# Patient Record
Sex: FEMALE | Race: WHITE | NOT HISPANIC OR LATINO | Employment: FULL TIME | URBAN - METROPOLITAN AREA
[De-identification: names, ages, dates, MRNs, and addresses within clinical notes are randomized per-mention and may not be internally consistent; named-entity substitution may affect disease eponyms.]

---

## 2021-08-31 ENCOUNTER — TRANSFERRED RECORDS (OUTPATIENT)
Dept: HEALTH INFORMATION MANAGEMENT | Facility: CLINIC | Age: 26
End: 2021-08-31

## 2021-09-03 ENCOUNTER — TRANSCRIBE ORDERS (OUTPATIENT)
Dept: OTHER | Age: 26
End: 2021-09-03

## 2021-09-03 DIAGNOSIS — D64.9 ANEMIA: Primary | ICD-10-CM

## 2021-09-07 ENCOUNTER — TRANSFERRED RECORDS (OUTPATIENT)
Dept: HEALTH INFORMATION MANAGEMENT | Facility: CLINIC | Age: 26
End: 2021-09-07

## 2021-09-08 ENCOUNTER — PRE VISIT (OUTPATIENT)
Dept: OTHER | Age: 26
End: 2021-09-08

## 2021-09-08 NOTE — TELEPHONE ENCOUNTER
Action 09/08/2021   Action Taken RECORDS RECEIVED FROM WOMEN OB SENT TO [facility]  [facility]

## 2021-10-19 ENCOUNTER — TELEPHONE (OUTPATIENT)
Dept: ONCOLOGY | Facility: HOSPITAL | Age: 26
End: 2021-10-19

## 2021-10-19 DIAGNOSIS — M79.676 PAIN OF TOE, UNSPECIFIED LATERALITY: Primary | ICD-10-CM

## 2021-12-12 ENCOUNTER — HEALTH MAINTENANCE LETTER (OUTPATIENT)
Age: 26
End: 2021-12-12

## 2021-12-21 ENCOUNTER — OFFICE VISIT (OUTPATIENT)
Dept: PODIATRY | Facility: CLINIC | Age: 26
End: 2021-12-21
Payer: COMMERCIAL

## 2021-12-21 VITALS
WEIGHT: 123.8 LBS | HEIGHT: 66 IN | BODY MASS INDEX: 19.89 KG/M2 | SYSTOLIC BLOOD PRESSURE: 110 MMHG | DIASTOLIC BLOOD PRESSURE: 62 MMHG

## 2021-12-21 DIAGNOSIS — L60.1 ONYCHOLYSIS: ICD-10-CM

## 2021-12-21 DIAGNOSIS — S90.221A SUBUNGUAL HEMATOMA OF RIGHT FOOT, INITIAL ENCOUNTER: Primary | ICD-10-CM

## 2021-12-21 PROCEDURE — 99202 OFFICE O/P NEW SF 15 MIN: CPT | Performed by: PODIATRIST

## 2021-12-21 RX ORDER — DESOGESTREL AND ETHINYL ESTRADIOL 21-5 (28)
KIT ORAL SEE ADMIN INSTRUCTIONS
COMMUNITY
Start: 2020-12-03

## 2021-12-21 ASSESSMENT — MIFFLIN-ST. JEOR: SCORE: 1318.3

## 2021-12-21 NOTE — PROGRESS NOTES
"Chief Complaint:     Patient presents with:  Foot Problems: Right hallux nail         HPI:  Brinda Presley is a 26 year old year old female who presents for evaluation of a toenail.    Location- right hallux nail, similar problem starting on left hallux nail  Severity- 0/10, symptoms  Duration- 4 month(s).  Prior treatments- clean and trim nails    Runner, loses  Nails frequently & they are irregular/thick.    Past Medical & Surgical History:  No past medical history on file.   No past surgical history on file.   No family history on file.     Social History:  ?  History   Smoking Status     Never Smoker   Smokeless Tobacco     Never Used     History   Drug Use Not on file     Social History    Substance and Sexual Activity      Alcohol use: Not on file      Allergies:  ?   No Known Allergies     Medications:    Current Outpatient Medications   Medication     desogestrel-ethinyl estradiol (KARIVA) 0.15-0.02/0.01 MG (21/5) tablet     No current facility-administered medications for this visit.       Physical Exam:  ?  Vitals:  /62   Ht 1.676 m (5' 6\")   Wt 56.2 kg (123 lb 12.8 oz)   BMI 19.98 kg/m     General:  WD/WN, in NAD.  A&O x3.  Dermatologic:    Thick, discolored hallux nails.  Skin texture, turgor is normal.  Vascular:  Pulses palpable bilateral.  Digital capillary refill time <3 seconds.  Skin temperature is normal to affected digit(s).  Generalized edema- none bilateral.  Focal edema- none to affected digit(s).  Neurologic:    Gross sensation normal.  Gait and balance normal.  Musculoskeletal:  Maximal pain to palpation of affected nail border(s).  Gross deformity      Assessment:      ICD-10-CM    1. Subungual hematoma of right foot, initial encounter  S90.221A Orthopedic  Referral   2. Onycholysis  L60.1           Plan:  No orders of the defined types were placed in this encounter.      Discussed the etiology and treatment of the condition with the patient.  Discussed treatment " options.  Nothing vs temp or perm removal.  Discussed shoe gear changes to help prevent this, see AVS.      Return:  Return if symptoms worsen or fail to improve.    Tejal Dalton DPM

## 2021-12-21 NOTE — PATIENT INSTRUCTIONS
PATIENT INSTRUCTIONS    Can remove nail permanently if desired      Toe Spacers:  -Multiple toe spacers: https://www.correcttoes.com, https://naturalfootgear.com    Toe socks:   -Worn underneath spacers  -Help prevent blisters when worn in shoes with a wide toe box (with or without toe spacers)  -https://www.injinji.com  -https://www.toesox.com.  -[email or web address]    Shoes with a wide toe box:  - Foot shaped  - widest at the tips of the toes (non-tapering)  -Allow the toes to spread/splay as they naturally should  -Promote strengthening the small intrinsic muscles in the foot that balance the toes & support the arch  -Accommodate toe spacers   With arch support & cushioning:  Birkenstock, Crocs, Keen,    With cushioning:  Altra, Héctor, Lems   With minimal cushioning:  Altra, Héctor, Vivobarefcathy, Lems

## 2021-12-21 NOTE — PROGRESS NOTES
"Chief Complaint:     Patient presents with:  Foot Problems: Right hallux nail     bilateral foot pain    HPI:  Brinda Presley is a 26 year old year old female who presents for evaluation of foot pain.    ***    Past Medical & Surgical History:  No past medical history on file.   No past surgical history on file.   No family history on file.     Social History:  ?  History   Smoking Status     Never Smoker   Smokeless Tobacco     Never Used     History   Drug Use Not on file     Social History    Substance and Sexual Activity      Alcohol use: Not on file      Allergies:  ?   No Known Allergies     Medications:    Current Outpatient Medications   Medication     desogestrel-ethinyl estradiol (KARIVA) 0.15-0.02/0.01 MG (21/5) tablet     No current facility-administered medications for this visit.       Physical Exam:  ?  Vitals:  /62   Ht 1.676 m (5' 6\")   Wt 56.2 kg (123 lb 12.8 oz)   BMI 19.98 kg/m     General:  WD/WN, in NAD.  A&O x3.  Dermatologic:    Skin {IS/IS NOT:9024} intact, open lesions {present/absent:879819}.   Skin texture, turgor is {.:172404::\"normal\"}.  Vascular:  Pulses ***palpable {RIGHT/LEFT/BILATERAL:673186}.  Digital capillary refill time {NORMAL/DELAYED:886084::\"normal\"} {RIGHT/LEFT/BILATERAL:751421}.  Skin temperature is {ORTHO TOUCH TEMPERATURE:309210::\"normal\"} {RIGHT/LEFT/BILATERAL:777704}.  Generalized edema- {EDEMA1:740743} {RIGHT/LEFT/BILATERAL:956923}.  Focal edema- {DEGREE - NONE, NML, MILD, MOD, SEV:176396} *** {RIGHT/LEFT/BILATERAL:990751}.  Neurologic:    Gross sensation {.:802695::\"normal\"}.  Gait and balance {.:837962::\"normal\"}.  Musculoskeletal:  Maximal pain to palpation of *** {RIGHT/LEFT/BILATERAL:023883}.  {MILD/MODERATE/SEVERE:403459} pain to palpation of *** {RIGHT/LEFT/BILATERAL:879490}.  Ankle dorsiflexion ***10 degrees with the knee extended, ***10 degrees with the knee flexed.  Ankle ROM smooth, nonpainful {RIGHT/LEFT/BILATERAL:253125}.  STJ, MTJ ROM " "{LIMITED/FULL:278539::\"full\"}, {PAINFUL/PAIN FREE:844117::\"pain free\"} {RIGHT/LEFT/BILATERAL:101201}.    *** ROM {LIMITED/FULL:818960::\"full\"}, {PAINFUL/PAIN FREE:681386::\"pain free\"} {RIGHT/LEFT/BILATERAL:101201}.    Muscle strength {MUSCLE STRENGTH:958947::\"5/5 \"} foot and ankle*** {RIGHT/LEFT/BILATERAL:101201}.    FF:RF:  *** {RIGHT/LEFT/BILATERAL:101201}.  Stance:  RCSP {kalpana neutral varus valgus:684627} {RIGHT/LEFT/BILATERAL:101201}.  Foot type:  ***  Clinical deformity: ***    Imaging:   {XRAY Types:538656}  ***eight-bearing views {RIGHT/LEFT/BILATERAL:101201} *** dated ***, reveal ***      Assessment:      ICD-10-CM    1. Subungual hematoma of right foot, initial encounter  S90.221A Orthopedic  Referral   2. Onycholysis  L60.1         Plan:  No orders of the defined types were placed in this encounter.      Discussed the etiology and treatment of the condition with the patient.  Imaging studies reviewed and discussed with the patient.  Discussed surgical and conservative options.    -Injection***  -NSAID***  -Immobilization- boot*** post-op shoe***  -Orthoses- SuperFeet*** custom***  -Brace- ASO*** custom***  -PT***  -Activity***  -WB***    Return:  Return if symptoms worsen or fail to improve.    Tejal Dalton, [place]        "

## 2021-12-21 NOTE — LETTER
"    12/21/2021         RE: Brinda Presley  [address]  [facility] [address]        Dear Colleague,    Thank you for referring your patient, Brinda Presley, to the [facility] [place]. Please see a copy of my visit note below.    Chief Complaint:     Patient presents with:  Foot Problems: Right hallux nail         HPI:  Brinda Presley is a 26 year old year old female who presents for evaluation of a toenail.    Location- right hallux nail, similar problem starting on left hallux nail  Severity- 0/10, symptoms  Duration- 4 month(s).  Prior treatments- clean and trim nails    Runner, loses  Nails frequently & they are irregular/thick.    Past Medical & Surgical History:  No past medical history on file.   No past surgical history on file.   No family history on file.     Social History:  ?  History   Smoking Status     Never Smoker   Smokeless Tobacco     Never Used     History   Drug Use Not on file     Social History    Substance and Sexual Activity      Alcohol use: Not on file      Allergies:  ?   No Known Allergies     Medications:    Current Outpatient Medications   Medication     desogestrel-ethinyl estradiol (KARIVA) 0.15-0.02/0.01 MG (21/5) tablet     No current facility-administered medications for this visit.       Physical Exam:  ?  Vitals:  /62   Ht 1.676 m (5' 6\")   Wt 56.2 kg (123 lb 12.8 oz)   BMI 19.98 kg/m     General:  WD/WN, in NAD.  A&O x3.  Dermatologic:    Thick, discolored hallux nails.  Skin texture, turgor is normal.  Vascular:  Pulses palpable bilateral.  Digital capillary refill time <3 seconds.  Skin temperature is normal to affected digit(s).  Generalized edema- none bilateral.  Focal edema- none to affected digit(s).  Neurologic:    Gross sensation normal.  Gait and balance normal.  Musculoskeletal:  Maximal pain to palpation of affected nail border(s).  Gross deformity      Assessment:      ICD-10-CM    1. Subungual hematoma of " right foot, initial encounter  S90.221A Orthopedic  Referral   2. Onycholysis  L60.1           Plan:  No orders of the defined types were placed in this encounter.      Discussed the etiology and treatment of the condition with the patient.  Discussed treatment options.  Nothing vs temp or perm removal.  Discussed shoe gear changes to help prevent this, see AVS.      Return:  Return if symptoms worsen or fail to improve.    Tejal Dalton DPM            Again, thank you for allowing me to participate in the care of your patient.        Sincerely,        Tejal Dalton DPM

## 2022-10-03 ENCOUNTER — HEALTH MAINTENANCE LETTER (OUTPATIENT)
Age: 27
End: 2022-10-03

## 2023-02-11 ENCOUNTER — HEALTH MAINTENANCE LETTER (OUTPATIENT)
Age: 28
End: 2023-02-11

## 2024-03-09 ENCOUNTER — HEALTH MAINTENANCE LETTER (OUTPATIENT)
Age: 29
End: 2024-03-09